# Patient Record
Sex: MALE | URBAN - METROPOLITAN AREA
[De-identification: names, ages, dates, MRNs, and addresses within clinical notes are randomized per-mention and may not be internally consistent; named-entity substitution may affect disease eponyms.]

---

## 2019-03-19 ENCOUNTER — IMPORTED ENCOUNTER (OUTPATIENT)
Dept: URBAN - METROPOLITAN AREA CLINIC 9 | Facility: CLINIC | Age: 79
End: 2019-03-19

## 2019-03-28 ENCOUNTER — IMPORTED ENCOUNTER (OUTPATIENT)
Dept: URBAN - METROPOLITAN AREA CLINIC 9 | Facility: CLINIC | Age: 79
End: 2019-03-28

## 2019-03-29 ENCOUNTER — IMPORTED ENCOUNTER (OUTPATIENT)
Dept: URBAN - METROPOLITAN AREA CLINIC 9 | Facility: CLINIC | Age: 79
End: 2019-03-29

## 2020-12-07 NOTE — PATIENT DISCUSSION
pt aware the [facility] outcome may be different then OS done prior. Pt wants [facility] and aware [facility] will be correcting for distance but not doing any additional correcting. NVA will be needed for all activities arms length in.

## 2021-10-16 ASSESSMENT — KERATOMETRY
OD_K2POWER_DIOPTERS: 44
OD_AXISANGLE_DEGREES: 119
OS_AXISANGLE2_DEGREES: 154
OD_AXISANGLE2_DEGREES: 29
OD_K1POWER_DIOPTERS: 43
OS_AXISANGLE_DEGREES: 64
OS_K2POWER_DIOPTERS: 44.25
OS_K1POWER_DIOPTERS: 43.5

## 2021-10-16 ASSESSMENT — VISUAL ACUITY
OD_PH: 20/40 SN
OD_SC: 20/60 SN
OD_SC: 20/400 SN
OS_SC: 20/50 SN
OD_CC: 20/60 SN
OS_CC: 20/50 SN
OS_SC: CF 2FT SN

## 2021-10-16 ASSESSMENT — TONOMETRY
OD_IOP_MMHG: 32
OD_IOP_MMHG: 23
OS_IOP_MMHG: 19
OD_IOP_MMHG: 15
OS_IOP_MMHG: 32
OS_IOP_MMHG: 23

## 2022-02-03 NOTE — PROCEDURE NOTE: SURGICAL
<p>Prior to commencing surgery patient identification, surgical procedure, site, and side were confirmed by Dr. Fabi Suggs. Following topical proparacaine anesthesia, the patient was positioned at the YAG laser, a contact lens coupled to the cornea with methylcellulose and an axial posterior capsulotomy performed without complication using 3.2 Mj x 32. Excess methylcellulose was washed from the eye, one drop of Alphagan was instilled and the patient returned to the holding area having tolerated the procedure well and without complication. </p><p>MRN: [id number]</p><p>&nbsp;</p>

## 2022-07-02 RX ORDER — ATORVASTATIN CALCIUM 10 MG/1
TABLET, FILM COATED ORAL
COMMUNITY
End: 2022-10-31 | Stop reason: SDUPTHER

## 2022-07-02 RX ORDER — AMLODIPINE BESYLATE AND BENAZEPRIL HYDROCHLORIDE 5; 20 MG/1; MG/1
1 CAPSULE ORAL
COMMUNITY
End: 2022-10-31 | Stop reason: SDUPTHER

## 2022-07-02 RX ORDER — HYDROCHLOROTHIAZIDE 25 MG/1
TABLET ORAL
COMMUNITY
End: 2022-10-31 | Stop reason: SDUPTHER

## 2022-07-02 RX ORDER — ZOLPIDEM TARTRATE 12.5 MG/1
TABLET, FILM COATED, EXTENDED RELEASE ORAL
COMMUNITY
Start: 2022-04-18 | End: 2022-10-04 | Stop reason: SDUPTHER

## 2022-10-04 PROBLEM — N40.0 BENIGN PROSTATIC HYPERPLASIA: Status: ACTIVE | Noted: 2022-10-04

## 2022-10-04 PROBLEM — R97.20 ELEVATED PROSTATE SPECIFIC ANTIGEN (PSA): Status: ACTIVE | Noted: 2022-10-04

## 2022-10-04 PROBLEM — H60.90 OTITIS EXTERNA: Status: ACTIVE | Noted: 2022-10-04

## 2022-10-04 PROBLEM — C61 PROSTATE CANCER (HCC): Status: ACTIVE | Noted: 2022-10-04

## 2022-10-04 PROBLEM — N40.0 ENLARGED PROSTATE: Status: ACTIVE | Noted: 2022-10-04

## 2022-10-04 PROBLEM — I10 ESSENTIAL HYPERTENSION: Status: ACTIVE | Noted: 2022-10-04

## 2022-10-04 PROBLEM — G47.00 INSOMNIA: Status: ACTIVE | Noted: 2022-10-04

## 2022-10-04 PROBLEM — M15.9 GENERALIZED OSTEOARTHROSIS, INVOLVING MULTIPLE SITES: Status: ACTIVE | Noted: 2022-10-04

## 2022-10-04 PROBLEM — E78.5 HYPERLIPIDEMIA: Status: ACTIVE | Noted: 2022-10-04

## 2022-10-04 PROBLEM — F32.A DEPRESSIVE DISORDER: Status: ACTIVE | Noted: 2022-10-04

## 2022-10-04 PROBLEM — K59.00 CONSTIPATION: Status: ACTIVE | Noted: 2022-10-04
